# Patient Record
Sex: FEMALE | Race: AMERICAN INDIAN OR ALASKA NATIVE | NOT HISPANIC OR LATINO
[De-identification: names, ages, dates, MRNs, and addresses within clinical notes are randomized per-mention and may not be internally consistent; named-entity substitution may affect disease eponyms.]

---

## 2020-01-15 ENCOUNTER — RESULT REVIEW (OUTPATIENT)
Age: 55
End: 2020-01-15

## 2021-08-05 PROBLEM — Z00.00 ENCOUNTER FOR PREVENTIVE HEALTH EXAMINATION: Status: ACTIVE | Noted: 2021-08-05

## 2021-08-13 ENCOUNTER — APPOINTMENT (OUTPATIENT)
Dept: ORTHOPEDIC SURGERY | Facility: CLINIC | Age: 56
End: 2021-08-13
Payer: MEDICAID

## 2021-08-13 ENCOUNTER — NON-APPOINTMENT (OUTPATIENT)
Age: 56
End: 2021-08-13

## 2021-08-13 VITALS
WEIGHT: 164 LBS | HEART RATE: 59 BPM | HEIGHT: 60 IN | SYSTOLIC BLOOD PRESSURE: 142 MMHG | DIASTOLIC BLOOD PRESSURE: 75 MMHG | BODY MASS INDEX: 32.2 KG/M2

## 2021-08-13 DIAGNOSIS — Z86.2 PERSONAL HISTORY OF DISEASES OF THE BLOOD AND BLOOD-FORMING ORGANS AND CERTAIN DISORDERS INVOLVING THE IMMUNE MECHANISM: ICD-10-CM

## 2021-08-13 DIAGNOSIS — Z87.39 PERSONAL HISTORY OF OTHER DISEASES OF THE MUSCULOSKELETAL SYSTEM AND CONNECTIVE TISSUE: ICD-10-CM

## 2021-08-13 DIAGNOSIS — D89.89 OTHER SPECIFIED DISORDERS INVOLVING THE IMMUNE MECHANISM, NOT ELSEWHERE CLASSIFIED: ICD-10-CM

## 2021-08-13 DIAGNOSIS — Z82.49 FAMILY HISTORY OF ISCHEMIC HEART DISEASE AND OTHER DISEASES OF THE CIRCULATORY SYSTEM: ICD-10-CM

## 2021-08-13 DIAGNOSIS — Z82.3 FAMILY HISTORY OF STROKE: ICD-10-CM

## 2021-08-13 DIAGNOSIS — Z86.79 PERSONAL HISTORY OF OTHER DISEASES OF THE CIRCULATORY SYSTEM: ICD-10-CM

## 2021-08-13 DIAGNOSIS — Z87.891 PERSONAL HISTORY OF NICOTINE DEPENDENCE: ICD-10-CM

## 2021-08-13 PROCEDURE — 73110 X-RAY EXAM OF WRIST: CPT | Mod: RT

## 2021-08-13 PROCEDURE — 99204 OFFICE O/P NEW MOD 45 MIN: CPT | Mod: 25

## 2021-08-13 PROCEDURE — 73130 X-RAY EXAM OF HAND: CPT | Mod: RT

## 2021-08-13 PROCEDURE — 20605 DRAIN/INJ JOINT/BURSA W/O US: CPT | Mod: RT

## 2021-08-13 RX ORDER — LOSARTAN POTASSIUM 100 MG/1
100 TABLET, FILM COATED ORAL
Refills: 0 | Status: ACTIVE | COMMUNITY

## 2021-08-13 RX ORDER — LIDOCAINE HYDROCHLORIDE 10 MG/ML
1 INJECTION, SOLUTION INFILTRATION; PERINEURAL
Refills: 0 | Status: COMPLETED | OUTPATIENT
Start: 2021-08-13

## 2021-08-13 RX ORDER — METHYLPRED ACET/NACL,ISO-OS/PF 40 MG/ML
40 VIAL (ML) INJECTION
Qty: 1 | Refills: 0 | Status: COMPLETED | OUTPATIENT
Start: 2021-08-13

## 2021-08-13 RX ADMIN — METHYLPREDNISOLONE ACETATE 1 MG/ML: 40 INJECTION, SUSPENSION INTRALESIONAL; INTRAMUSCULAR; INTRASYNOVIAL; SOFT TISSUE at 00:00

## 2021-08-13 RX ADMIN — LIDOCAINE HYDROCHLORIDE 0.5 %: 10 INJECTION, SOLUTION INFILTRATION; PERINEURAL at 00:00

## 2021-08-13 NOTE — DISCUSSION/SUMMARY
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  She is more symptomatic secondary to the STT joint arthritis.\par \par I had a discussion with the patient regarding today's visit, the prognosis of this diagnosis, and treatment recommendations and options. At this time, I recommended a cortisone injection into the right STT joint. She understands this may not provide long-term relief and if it does not then she may ultimately require surgical intervention.\par \par She has agreed to the above plan of management and has expressed full understanding.  All questions were fully answered to their satisfaction. \par \par My cumulative time spent on this visit included: Preparation for the visit, review of the medical records, review of pertinent diagnostic studies, examination and counseling of the patient on the above diagnosis, treatment plan and prognosis, orders of diagnostic tests, medication and/or appropriate procedures and documentation in the medical records of today's visit.

## 2021-08-13 NOTE — END OF VISIT
[FreeTextEntry3] : This note was written by Adela Zapien on 08/13/2021 acting solely as a scribe for Dr. Bipin Cano.\par  \par All medical record entries made by the Scribe were at my, Dr. Bipin Cano, direction and personally dictated by me on 08/13/2021. I have personally reviewed the chart and agree that the record accurately reflects my personal performance of the history, physical exam, assessment and plan.

## 2021-08-13 NOTE — ADDENDUM
[FreeTextEntry1] : I, Adela Zapien, acted solely as a scribe for Dr. Cano on this date on 08/13/2021.

## 2021-08-13 NOTE — PROCEDURE
[FreeTextEntry1] : - After a discussion of risks and benefits, the patient agreed to proceed with a cortisone injection.  \par -  Side Injected: Right wrist STT joint\par -  Medications injected: 0.5cc of 1% Lidocaine and 1cc of 40mg of Depomedrol, using sterile technique.\par -  Patient tolerated the procedure well, without complications.\par -  Patient noted immediate relief of the symptoms, secondary to the anesthetic effects of the injection.\par -  Patient was told that the pain may worsen for a day or two, and should then begin to improve.\par -  Instructions: The patient was instructed on the use of ice, anti-inflammatory agents, or Tylenol, and activity modification.\par -  Follow-up: Within 4 weeks to assess the response to the injection.

## 2021-08-13 NOTE — HISTORY OF PRESENT ILLNESS
[Right] : right hand dominant [FreeTextEntry1] : She comes in today for evaluation of right hand and wrist pain which began 1 year ago. She reports the pain to have progressed and worsened of late. She denies injury. She states she has an associated numbness and tingling in the mornings when she wakes. She has had a previous xray and MRI of the right wrist. She states she wears a thumb spica brace with some relief. She has yet to receive treatment for her symptoms. \par \par She works as a baker.\par \par She was referred by her PCP.

## 2021-08-13 NOTE — PHYSICAL EXAM
[de-identified] : - Constitutional: This is a female in no obvious distress.  \par - Psych: Patient is alert and oriented to person, place and time.  Patient has a normal mood and affect.\par - Cardiovascular: Normal pulses throughout the upper extremities.  No significant varicosities are noted in the upper extremities. \par - Neuro: Strength and sensation are intact throughout the upper extremities.  Patient has normal coordination.\par - Respiratory:  Patient exhibits no evidence of shortness of breath or difficulty breathing.\par - Skin: No rashes, lesions, or other abnormalities are noted in the upper extremities.\par \par --- \par \par Examination of her right hand and wrist demonstrate swelling dorsally at the STT joint.  She is tender in this region, and she states that this is where she is most symptomatic.  She also has milder tenderness along the CMC joint of the thumb.  There is minimal tenderness along the ulnocarpal joint in the region of the TFCC ligament.  There is mild tenderness along the dorsal wrist capsule in the region of the scapholunate ligament and mild tenderness without swelling along the FCR tendon.  She has full flexion and extension of the digits.  Provocative signs for carpal tunnel syndrome are negative.  She is neurologically intact distally along the radial, ulnar and median nerve distributions. 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  There is radial subluxation of the first metacarpal on the trapezium.  There is also widening of the scapholunate interval.\par \par I reviewed her MRI from 7/6/21. 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

## 2021-09-01 ENCOUNTER — APPOINTMENT (OUTPATIENT)
Dept: ORTHOPEDIC SURGERY | Facility: CLINIC | Age: 56
End: 2021-09-01

## 2021-09-10 ENCOUNTER — APPOINTMENT (OUTPATIENT)
Dept: ORTHOPEDIC SURGERY | Facility: CLINIC | Age: 56
End: 2021-09-10
Payer: MEDICAID

## 2021-09-10 PROCEDURE — 99214 OFFICE O/P EST MOD 30 MIN: CPT | Mod: 25

## 2021-09-10 PROCEDURE — 20605 DRAIN/INJ JOINT/BURSA W/O US: CPT | Mod: RT

## 2021-09-10 RX ORDER — LIDOCAINE HYDROCHLORIDE 10 MG/ML
1 INJECTION, SOLUTION INFILTRATION; PERINEURAL
Refills: 0 | Status: COMPLETED | OUTPATIENT
Start: 2021-09-10

## 2021-09-10 RX ORDER — METHYLPRED ACET/NACL,ISO-OS/PF 40 MG/ML
40 VIAL (ML) INJECTION
Qty: 1 | Refills: 0 | Status: COMPLETED | OUTPATIENT
Start: 2021-09-10

## 2021-09-10 RX ADMIN — LIDOCAINE HYDROCHLORIDE 0.5 %: 10 INJECTION, SOLUTION INFILTRATION; PERINEURAL at 00:00

## 2021-09-10 RX ADMIN — METHYLPREDNISOLONE ACETATE 1 MG/ML: 40 INJECTION, SUSPENSION INTRALESIONAL; INTRAMUSCULAR; INTRASYNOVIAL; SOFT TISSUE at 00:00

## 2021-09-10 NOTE — ADDENDUM
[FreeTextEntry1] : I, Adela Zapien, acted solely as a scribe for Dr. Cano on this date on 09/10/2021.

## 2021-09-10 NOTE — HISTORY OF PRESENT ILLNESS
[FreeTextEntry1] : Follow-up regarding right wrist and hand pain secondary to advanced STT joint arthritis as well as CMC joint arthritis of the thumb.  See note from when she was seen in the office 4 weeks ago.  She was given a cortisone injection at the STT joint.\par \par She notes no improvement from the cortisone injection 4 weeks ago. She reports a sharp pain in the wrist and hand at night. She rates her pain as a 10 out of 10 with use at this time.\par \par She works as a paige.

## 2021-09-10 NOTE — DISCUSSION/SUMMARY
[FreeTextEntry1] : I had a discussion regarding today's visit, the diagnosis and treatment recommendations and options.  We also discussed changes since the last visit.  At this time, I recommended a cortisone injection into the right thumb CMC joint.\par \par The patient has agreed to the above plan of management and has expressed full understanding.  All questions were fully answered to the patient's satisfaction.\par \par My cumulative time spent on today's visit was greater than 30 minutes and included: Preparation for the visit, review of the medical records, review of pertinent diagnostic studies, examination and counseling of the patient on the above diagnosis, treatment plan and prognosis, orders of diagnostic tests, medications and/or appropriate procedures and documentation in the medical records of today's visit.

## 2021-09-10 NOTE — END OF VISIT
[FreeTextEntry3] : This note was written by Adela Zapien on 09/10/2021 acting solely as a scribe for Dr. Bipin Cano.\par  \par All medical record entries made by the Scribe were at my, Dr. Bipin Cano, direction and personally dictated by me on 09/10/2021. I have personally reviewed the chart and agree that the record accurately reflects my personal performance of the history, physical exam, assessment and plan.

## 2021-09-10 NOTE — PHYSICAL EXAM
[de-identified] : - Constitutional: This is a female in no obvious distress.  \par - Psych: Patient is alert and oriented to person, place and time.  Patient has a normal mood and affect.\par - Cardiovascular: Normal pulses throughout the upper extremities.  No significant varicosities are noted in the upper extremities. \par - Neuro: Strength and sensation are intact throughout the upper extremities.  Patient has normal coordination.\par - Respiratory:  Patient exhibits no evidence of shortness of breath or difficulty breathing.\par - Skin: No rashes, lesions, or other abnormalities are noted in the upper extremities.\par \par --- \par \par Examination of her right hand and wrist no obvious residual swelling or tenderness dorsally at the STT joint.  She is more tender today along the CMC joint of the thumb.  She does have underlying ligamentous laxity of the CMC joint.  There is mild tenderness along the dorsal wrist capsule in the region of the scapholunate ligament and mild tenderness without swelling along the FCR tendon.  She has full flexion and extension of the digits.  Provocative signs for carpal tunnel syndrome are negative.  She remains neurologically intact distally along the radial, ulnar and median nerve distributions. 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  There is radial subluxation of the first metacarpal on the trapezium.  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

## 2021-10-08 ENCOUNTER — APPOINTMENT (OUTPATIENT)
Dept: ORTHOPEDIC SURGERY | Facility: CLINIC | Age: 56
End: 2021-10-08

## 2021-10-12 ENCOUNTER — NON-APPOINTMENT (OUTPATIENT)
Age: 56
End: 2021-10-12

## 2021-10-20 ENCOUNTER — APPOINTMENT (OUTPATIENT)
Dept: ORTHOPEDIC SURGERY | Facility: CLINIC | Age: 56
End: 2021-10-20
Payer: MEDICAID

## 2021-10-20 PROCEDURE — 99213 OFFICE O/P EST LOW 20 MIN: CPT

## 2021-10-20 NOTE — ADDENDUM
[FreeTextEntry1] : I, Adela Zapien, acted solely as a scribe for Dr. Cano on this date on 10/20/2021.

## 2021-10-20 NOTE — END OF VISIT
[FreeTextEntry3] : This note was written by Adela Zapien on 10/20/2021 acting solely as a scribe for Dr. Bipin Cano.\par  \par All medical record entries made by the Scribe were at my, Dr. Bipin Cano, direction and personally dictated by me on 10/20/2021. I have personally reviewed the chart and agree that the record accurately reflects my personal performance of the history, physical exam, assessment and plan.

## 2021-10-20 NOTE — PHYSICAL EXAM
[de-identified] : - Constitutional: This is a female in no obvious distress.  \par - Psych: Patient is alert and oriented to person, place and time.  Patient has a normal mood and affect.\par - Cardiovascular: Normal pulses throughout the upper extremities.  No significant varicosities are noted in the upper extremities. \par - Neuro: Strength and sensation are intact throughout the upper extremities.  Patient has normal coordination.\par - Respiratory:  Patient exhibits no evidence of shortness of breath or difficulty breathing.\par - Skin: No rashes, lesions, or other abnormalities are noted in the upper extremities.\par \par --- \par \par Examination of her right hand and wrist no obvious residual swelling or tenderness dorsally at the STT joint.  She has mild residual tenderness along the CMC joint which is much improved.  She does have underlying ligamentous laxity of the CMC joint.  She has full flexion and extension of the digits.  Provocative signs for carpal tunnel syndrome are negative.  She remains neurologically intact distally along the radial, ulnar and median nerve distributions. 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  There is radial subluxation of the first metacarpal on the trapezium.  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

## 2021-10-20 NOTE — HISTORY OF PRESENT ILLNESS
[FreeTextEntry1] : 40 days status post right thumb CMC joint cortisone injection #1 and greater than 2 months status post right wrist STT joint cortisone injection #1.\par \par She returns today, stating the injection did provide some relief. She notes post injection she was unable to move the hand for 3 days due to soreness but that has since resolved and she is feeling better. \par \par She works as a baker.

## 2021-10-20 NOTE — DISCUSSION/SUMMARY
[FreeTextEntry1] : I had a discussion regarding today's visit, the diagnosis and treatment recommendations and options.  We also discussed changes since the last visit.  At this time, as she is doing well, I have recommended observation to see if her symptoms continue to improve on their own. Additionally, as per her request, I am referring her to hand therapy, she was provided with the appropriate referral.  She will also be fitted with a CMC joint opponent splint.\par \par The patient has agreed to the above plan of management and has expressed full understanding.  All questions were fully answered to the patient's satisfaction.\par \par My cumulative time spent on today's visit was greater than 30 minutes and included: Preparation for the visit, review of the medical records, review of pertinent diagnostic studies, examination and counseling of the patient on the above diagnosis, treatment plan and prognosis, orders of diagnostic tests, medications and/or appropriate procedures and documentation in the medical records of today's visit.

## 2022-02-16 ENCOUNTER — TRANSCRIPTION ENCOUNTER (OUTPATIENT)
Age: 57
End: 2022-02-16

## 2022-05-20 ENCOUNTER — EMERGENCY (EMERGENCY)
Facility: HOSPITAL | Age: 57
LOS: 1 days | Discharge: HOME | End: 2022-05-20
Attending: EMERGENCY MEDICINE
Payer: MEDICAID

## 2022-05-20 VITALS
RESPIRATION RATE: 18 BRPM | SYSTOLIC BLOOD PRESSURE: 173 MMHG | WEIGHT: 175.93 LBS | HEART RATE: 86 BPM | DIASTOLIC BLOOD PRESSURE: 113 MMHG | OXYGEN SATURATION: 95 % | TEMPERATURE: 98 F

## 2022-05-20 LAB
ALBUMIN SERPL ELPH-MCNC: 3.3 G/DL — LOW (ref 3.5–5)
ALP SERPL-CCNC: 61 U/L — SIGNIFICANT CHANGE UP (ref 40–120)
ALT FLD-CCNC: 32 U/L DA — SIGNIFICANT CHANGE UP (ref 10–60)
ANION GAP SERPL CALC-SCNC: 7 MMOL/L — SIGNIFICANT CHANGE UP (ref 5–17)
AST SERPL-CCNC: 19 U/L — SIGNIFICANT CHANGE UP (ref 10–40)
BASOPHILS # BLD AUTO: 0.04 K/UL — SIGNIFICANT CHANGE UP (ref 0–0.2)
BASOPHILS NFR BLD AUTO: 0.4 % — SIGNIFICANT CHANGE UP (ref 0–2)
BILIRUB SERPL-MCNC: 0.2 MG/DL — SIGNIFICANT CHANGE UP (ref 0.2–1.2)
BUN SERPL-MCNC: 20 MG/DL — HIGH (ref 7–18)
CALCIUM SERPL-MCNC: 8.7 MG/DL — SIGNIFICANT CHANGE UP (ref 8.4–10.5)
CHLORIDE SERPL-SCNC: 107 MMOL/L — SIGNIFICANT CHANGE UP (ref 96–108)
CO2 SERPL-SCNC: 27 MMOL/L — SIGNIFICANT CHANGE UP (ref 22–31)
CREAT SERPL-MCNC: 0.79 MG/DL — SIGNIFICANT CHANGE UP (ref 0.5–1.3)
EGFR: 87 ML/MIN/1.73M2 — SIGNIFICANT CHANGE UP
EOSINOPHIL # BLD AUTO: 0.21 K/UL — SIGNIFICANT CHANGE UP (ref 0–0.5)
EOSINOPHIL NFR BLD AUTO: 2.1 % — SIGNIFICANT CHANGE UP (ref 0–6)
GLUCOSE SERPL-MCNC: 109 MG/DL — HIGH (ref 70–99)
HCT VFR BLD CALC: 34 % — LOW (ref 34.5–45)
HGB BLD-MCNC: 10.8 G/DL — LOW (ref 11.5–15.5)
IMM GRANULOCYTES NFR BLD AUTO: 0.2 % — SIGNIFICANT CHANGE UP (ref 0–1.5)
LIDOCAIN IGE QN: 321 U/L — SIGNIFICANT CHANGE UP (ref 73–393)
LYMPHOCYTES # BLD AUTO: 3.32 K/UL — HIGH (ref 1–3.3)
LYMPHOCYTES # BLD AUTO: 33.4 % — SIGNIFICANT CHANGE UP (ref 13–44)
MAGNESIUM SERPL-MCNC: 2 MG/DL — SIGNIFICANT CHANGE UP (ref 1.6–2.6)
MCHC RBC-ENTMCNC: 25.5 PG — LOW (ref 27–34)
MCHC RBC-ENTMCNC: 31.8 GM/DL — LOW (ref 32–36)
MCV RBC AUTO: 80.2 FL — SIGNIFICANT CHANGE UP (ref 80–100)
MONOCYTES # BLD AUTO: 0.8 K/UL — SIGNIFICANT CHANGE UP (ref 0–0.9)
MONOCYTES NFR BLD AUTO: 8 % — SIGNIFICANT CHANGE UP (ref 2–14)
NEUTROPHILS # BLD AUTO: 5.56 K/UL — SIGNIFICANT CHANGE UP (ref 1.8–7.4)
NEUTROPHILS NFR BLD AUTO: 55.9 % — SIGNIFICANT CHANGE UP (ref 43–77)
NRBC # BLD: 0 /100 WBCS — SIGNIFICANT CHANGE UP (ref 0–0)
NT-PROBNP SERPL-SCNC: 45 PG/ML — SIGNIFICANT CHANGE UP (ref 0–125)
PLATELET # BLD AUTO: 374 K/UL — SIGNIFICANT CHANGE UP (ref 150–400)
POTASSIUM SERPL-MCNC: 4 MMOL/L — SIGNIFICANT CHANGE UP (ref 3.5–5.3)
POTASSIUM SERPL-SCNC: 4 MMOL/L — SIGNIFICANT CHANGE UP (ref 3.5–5.3)
PROT SERPL-MCNC: 7.7 G/DL — SIGNIFICANT CHANGE UP (ref 6–8.3)
RBC # BLD: 4.24 M/UL — SIGNIFICANT CHANGE UP (ref 3.8–5.2)
RBC # FLD: 15.4 % — HIGH (ref 10.3–14.5)
SARS-COV-2 RNA SPEC QL NAA+PROBE: SIGNIFICANT CHANGE UP
SODIUM SERPL-SCNC: 141 MMOL/L — SIGNIFICANT CHANGE UP (ref 135–145)
TROPONIN I, HIGH SENSITIVITY RESULT: 5.9 NG/L — SIGNIFICANT CHANGE UP
TSH SERPL-MCNC: 2.28 UU/ML — SIGNIFICANT CHANGE UP (ref 0.34–4.82)
WBC # BLD: 9.95 K/UL — SIGNIFICANT CHANGE UP (ref 3.8–10.5)
WBC # FLD AUTO: 9.95 K/UL — SIGNIFICANT CHANGE UP (ref 3.8–10.5)

## 2022-05-20 PROCEDURE — 99285 EMERGENCY DEPT VISIT HI MDM: CPT

## 2022-05-20 PROCEDURE — 71046 X-RAY EXAM CHEST 2 VIEWS: CPT | Mod: 26

## 2022-05-20 RX ORDER — IBUPROFEN 200 MG
600 TABLET ORAL ONCE
Refills: 0 | Status: COMPLETED | OUTPATIENT
Start: 2022-05-20 | End: 2022-05-20

## 2022-05-20 NOTE — ED PROVIDER NOTE - PATIENT PORTAL LINK FT
You can access the FollowMyHealth Patient Portal offered by [facility] by registering at the following website: [email or web address]. By joining [facility]’s FollowMyHealth portal, you will also be able to view your health information using other applications (apps) compatible with our system.

## 2022-05-20 NOTE — ED PROVIDER NOTE - NSFOLLOWUPINSTRUCTIONS_ED_ALL_ED_FT
HAPPY BIRTHDAY!    WHAT YOU NEED TO KNOW:    Leg edema is swelling caused by fluid buildup. Your legs may swell if you sit or stand for long periods of time, are pregnant, or are injured. Swelling may also occur if you have heart failure or circulation problems. This means that your heart does not pump blood through your body as it should.  Lower Leg Edema         DISCHARGE INSTRUCTIONS:    Call your local emergency number (911 in the [place]) for any of the following:   •You cannot walk.      •You have chest pain or trouble breathing that is worse when you lie down.      •You suddenly feel lightheaded and have trouble breathing.      •You have new and sudden chest pain. You may have more pain when you take deep breaths or cough.      •You cough up blood.      Return to the emergency department if:   •You feel faint or confused.      •Your skin turns blue or gray.      •Your leg feels warm, tender, and painful. It may be swollen and red.      Call your doctor if:   •You have a fever or feel more tired than usual.      •The veins in your legs look larger than usual. They may look full or bulging.      •Your legs itch or feel heavy.      •You have red or white areas or sores on your legs. The skin may also appear dimpled or have indentations.      •You are gaining weight.      •You have trouble moving your ankles.      •The swelling does not go away, or other parts of your body swell.      •You have questions or concerns about your condition or care.      Self-care:   •Elevate your legs. Raise your legs above the level of your heart as often as you can. This will help decrease swelling and pain. Prop your legs on pillows or blankets to keep them elevated comfortably.  Elevate Leg           •Wear pressure stockings, if directed. These tight stockings put pressure on your legs to promote blood flow and prevent blood clots. Put them on before you get out of bed. Wear the stockings during the day. Do not wear them while you sleep.  Pressure Stockings            •Stay active. Do not stand or sit for long periods of time. Ask your healthcare provider about the best exercise plan for you.  Walking for Exercise           •Eat healthy foods. Healthy foods include fruits, vegetables, whole-grain breads, low-fat dairy products, beans, lean meats, and fish. Ask if you need to be on a special diet.  Healthy Foods           •Limit sodium (salt). Salt will make your body hold even more fluid. Your healthcare provider will tell you how many milligrams (mg) of salt you can have each day.             Follow up with your doctor as directed: Write down your questions so you remember to ask them during your visits.
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
## 2022-05-20 NOTE — ED PROVIDER NOTE - CLINICAL SUMMARY MEDICAL DECISION MAKING FREE TEXT BOX
56 year old female with leg swelling recently increased her blood pressure meds including Amlodipine. Symptoms maybe the side effects of the medications. Will get labs to reassess for kidney/thyroid/heart disease.

## 2022-05-20 NOTE — ED ADULT NURSE NOTE - NSIMPLEMENTINTERV_GEN_ALL_ED
Implemented All Universal Safety Interventions:  [place] to call system. Call bell, personal items and telephone within reach. Instruct patient to call for assistance. Room bathroom lighting operational. Non-slip footwear when patient is off stretcher. Physically safe environment: no spills, clutter or unnecessary equipment. Stretcher in lowest position, wheels locked, appropriate side rails in place.

## 2022-05-20 NOTE — ED PROVIDER NOTE - OBJECTIVE STATEMENT
56 year old female with PMHx of HTN (Low Zartan and Amlodipine) is presenting to the ED with chief complaint of leg swelling with discomfort since yesterday. Patient works as a PCA for night shifts. Right before work she felt light headed so she checked her blood pressure, which was elevated. According to the patient, she had swelling since last week but resolved on its own. For the past 2 months her HTN meds were both increased to 10mg. No chest pain, shortness of breath, nausea, dizziness, and history of heart or kidney disease. NKDA.

## 2022-05-21 VITALS
OXYGEN SATURATION: 100 % | DIASTOLIC BLOOD PRESSURE: 89 MMHG | TEMPERATURE: 98 F | RESPIRATION RATE: 18 BRPM | SYSTOLIC BLOOD PRESSURE: 158 MMHG | HEART RATE: 75 BPM

## 2022-05-21 PROCEDURE — 83880 ASSAY OF NATRIURETIC PEPTIDE: CPT

## 2022-05-21 PROCEDURE — 93005 ELECTROCARDIOGRAM TRACING: CPT

## 2022-05-21 PROCEDURE — 80053 COMPREHEN METABOLIC PANEL: CPT

## 2022-05-21 PROCEDURE — 84484 ASSAY OF TROPONIN QUANT: CPT

## 2022-05-21 PROCEDURE — 71046 X-RAY EXAM CHEST 2 VIEWS: CPT

## 2022-05-21 PROCEDURE — 99285 EMERGENCY DEPT VISIT HI MDM: CPT | Mod: 25

## 2022-05-21 PROCEDURE — 87635 SARS-COV-2 COVID-19 AMP PRB: CPT

## 2022-05-21 PROCEDURE — 36415 COLL VENOUS BLD VENIPUNCTURE: CPT

## 2022-05-21 PROCEDURE — 83690 ASSAY OF LIPASE: CPT

## 2022-05-21 PROCEDURE — 83735 ASSAY OF MAGNESIUM: CPT

## 2022-05-21 PROCEDURE — 85025 COMPLETE CBC W/AUTO DIFF WBC: CPT

## 2022-05-21 PROCEDURE — 84443 ASSAY THYROID STIM HORMONE: CPT

## 2022-05-21 RX ADMIN — Medication 600 MILLIGRAM(S): at 00:15

## 2022-05-26 PROBLEM — I10 ESSENTIAL (PRIMARY) HYPERTENSION: Chronic | Status: ACTIVE | Noted: 2022-05-21

## 2022-06-01 ENCOUNTER — APPOINTMENT (OUTPATIENT)
Dept: ORTHOPEDIC SURGERY | Facility: CLINIC | Age: 57
End: 2022-06-01
Payer: COMMERCIAL

## 2022-06-01 ENCOUNTER — APPOINTMENT (OUTPATIENT)
Dept: ORTHOPEDIC SURGERY | Facility: CLINIC | Age: 57
End: 2022-06-01

## 2022-06-01 ENCOUNTER — NON-APPOINTMENT (OUTPATIENT)
Age: 57
End: 2022-06-01

## 2022-06-01 VITALS — SYSTOLIC BLOOD PRESSURE: 139 MMHG | DIASTOLIC BLOOD PRESSURE: 87 MMHG | HEART RATE: 67 BPM

## 2022-06-01 DIAGNOSIS — M25.562 PAIN IN RIGHT KNEE: ICD-10-CM

## 2022-06-01 DIAGNOSIS — M25.561 PAIN IN RIGHT KNEE: ICD-10-CM

## 2022-06-01 PROCEDURE — 20610 DRAIN/INJ JOINT/BURSA W/O US: CPT | Mod: LT

## 2022-06-01 PROCEDURE — 73564 X-RAY EXAM KNEE 4 OR MORE: CPT | Mod: 50

## 2022-06-01 PROCEDURE — 99215 OFFICE O/P EST HI 40 MIN: CPT | Mod: 25

## 2022-06-01 RX ORDER — LIDOCAINE HYDROCHLORIDE 10 MG/ML
1 INJECTION, SOLUTION INFILTRATION; PERINEURAL
Refills: 0 | Status: COMPLETED | OUTPATIENT
Start: 2022-06-01

## 2022-06-01 RX ORDER — METHYLPRED ACET/NACL,ISO-OS/PF 80 MG/ML
80 VIAL (ML) INJECTION
Qty: 1 | Refills: 0 | Status: COMPLETED | OUTPATIENT
Start: 2022-06-01

## 2022-06-01 RX ADMIN — LIDOCAINE HYDROCHLORIDE 3 %: 10 INJECTION, SOLUTION INFILTRATION; PERINEURAL at 00:00

## 2022-06-01 RX ADMIN — METHYLPREDNISOLONE ACETATE 1 MG/ML: 80 INJECTION, SUSPENSION INTRA-ARTICULAR; INTRALESIONAL; INTRAMUSCULAR; SOFT TISSUE at 00:00

## 2022-06-07 ENCOUNTER — NON-APPOINTMENT (OUTPATIENT)
Age: 57
End: 2022-06-07

## 2022-06-08 ENCOUNTER — EMERGENCY (EMERGENCY)
Facility: HOSPITAL | Age: 57
LOS: 1 days | Discharge: HOME | End: 2022-06-08
Attending: EMERGENCY MEDICINE
Payer: COMMERCIAL

## 2022-06-08 VITALS
WEIGHT: 171.96 LBS | SYSTOLIC BLOOD PRESSURE: 164 MMHG | RESPIRATION RATE: 16 BRPM | OXYGEN SATURATION: 99 % | HEART RATE: 85 BPM | DIASTOLIC BLOOD PRESSURE: 90 MMHG | TEMPERATURE: 98 F

## 2022-06-08 VITALS
DIASTOLIC BLOOD PRESSURE: 83 MMHG | RESPIRATION RATE: 17 BRPM | SYSTOLIC BLOOD PRESSURE: 153 MMHG | TEMPERATURE: 98 F | HEART RATE: 76 BPM | OXYGEN SATURATION: 98 %

## 2022-06-08 PROCEDURE — 99284 EMERGENCY DEPT VISIT MOD MDM: CPT

## 2022-06-08 PROCEDURE — 99282 EMERGENCY DEPT VISIT SF MDM: CPT

## 2022-06-08 PROCEDURE — 99053 MED SERV 10PM-8AM 24 HR FAC: CPT

## 2022-06-08 RX ORDER — IBUPROFEN 200 MG
600 TABLET ORAL ONCE
Refills: 0 | Status: DISCONTINUED | OUTPATIENT
Start: 2022-06-08 | End: 2022-06-11

## 2022-06-08 NOTE — ED PROVIDER NOTE - PATIENT PORTAL LINK FT
You can access the FollowMyHealth Patient Portal offered by [facility] by registering at the following website: [email or web address]. By joining [facility]’s FollowMyHealth portal, you will also be able to view your health information using other applications (apps) compatible with our system.

## 2022-06-08 NOTE — ED PROVIDER NOTE - MUSCULOSKELETAL, MLM
Spine appears normal, range of motion is not limited, no joint tenderness. ttp at let upper shoulder, no hematoma.
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
## 2022-06-08 NOTE — ED ADULT NURSE NOTE - CHPI ED NUR SYMPTOMS NEG
no abrasion/no blurred vision/no change in level of consciousness/no chest pain/no chest wall tenderness/no loss of consciousness/no seizure/no vomiting/no weakness

## 2022-06-08 NOTE — ED PROVIDER NOTE - CONSTITUTIONAL, MLM
Well appearing, awake, alert, oriented to person, place, time/situation and in no apparent distress. crying normal...
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
## 2022-06-14 ENCOUNTER — APPOINTMENT (OUTPATIENT)
Dept: ORTHOPEDIC SURGERY | Facility: CLINIC | Age: 57
End: 2022-06-14

## 2022-06-15 ENCOUNTER — APPOINTMENT (OUTPATIENT)
Dept: ORTHOPEDIC SURGERY | Facility: CLINIC | Age: 57
End: 2022-06-15
Payer: COMMERCIAL

## 2022-06-15 VITALS — HEART RATE: 76 BPM | SYSTOLIC BLOOD PRESSURE: 149 MMHG | DIASTOLIC BLOOD PRESSURE: 82 MMHG

## 2022-06-15 PROCEDURE — 20610 DRAIN/INJ JOINT/BURSA W/O US: CPT | Mod: RT

## 2022-06-15 PROCEDURE — 99214 OFFICE O/P EST MOD 30 MIN: CPT | Mod: 25

## 2022-06-15 RX ORDER — HYALURONATE SODIUM 30 MG/2 ML
30 SYRINGE (ML) INTRAARTICULAR
Qty: 8 | Refills: 0 | Status: ACTIVE | OUTPATIENT
Start: 2022-06-01

## 2022-06-15 NOTE — PROCEDURE
[de-identified] : Using sterile technique, 2cc of depomedrol 40mg/ml and 3cc of 1% plain lidocaine was drawn up into a sterile 5cc syringe.  The right knee was then sterilely prepped with chlorhexidine and ethylene chloride spray was used as an anesthetic prior to injection.  The depomedrol/lidocaine mixture was injected into the knee joint just above and lateral to the patella into the suprapatellar pouch.  The patient tolerated the procedure well without difficulty.  The patient was given instructions on the use of ice and anti-inflammatories post injection site soreness
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
## 2022-06-15 NOTE — DISCUSSION/SUMMARY
[de-identified] : This patient presents today for follow-up regarding osteoarthritis about both knees.  On the last office visit she did have an injection into the left knee which gave her significant improvement.  She continues to complain of pain about the right knee.  On today's visit we injected the right knee with Depo-Medrol lidocaine.  I do think she is a good candidate for viscosupplementation I will request authorization for this.  Apparently it was requested previously however the request did not go through as she did not have an injection of cortisone into both knees.  We will request for the viscosupplementation.  I will see her back when the medicine is received in the office to begin treatment.  Patient will continue taking the meloxicam that was prescribed by our office.

## 2022-06-15 NOTE — PHYSICAL EXAM
[de-identified] : The patient appears well nourished  and in no apparent distress.  The patient is alert and oriented to person, place, and time.   Affect and mood appear normal.    The head is normocephalic and atraumatic.  The eyes reveal normal sclera and extra ocular muscles are intact.   The neck appears normal with no jugular venous distention or masses noted.   Skin shows normal turgor with no evidence of eczema or psoriasis.  No respiratory distress noted.  The patient ambulates with an antalgic gait.\par \par The right and left knees have decreased range of motion the left 5-115 in the right 0-120.  Tenderness is noted diffusely over the left knee.  Pain is noted with terminal flexion.  Crepitations are noted..  There is tenderness about the medial joint bilaterally.  Negative Ai sign.  There is no soft tissue swelling, warmth, or erythema.   There is a negative Lachman sign.  There is no instability to varus/valgus stress or anterior/posterior drawer.  There is normal strength in the in the quadriceps and hamstring muscles.  Sensation is intact to the lower estremity. There are normal pulses distally and good capillary refill. No edema or lymphadenopathy noted.  \par

## 2022-06-15 NOTE — REASON FOR VISIT
[Follow-Up Visit] : a follow-up visit for [FreeTextEntry2] : Primary osteoarthritis of bilateral knees

## 2022-06-24 ENCOUNTER — NON-APPOINTMENT (OUTPATIENT)
Age: 57
End: 2022-06-24

## 2022-07-08 RX ORDER — DICLOFENAC SODIUM 75 MG/1
75 TABLET, DELAYED RELEASE ORAL
Qty: 30 | Refills: 0 | Status: ACTIVE | COMMUNITY
Start: 2022-07-08 | End: 1900-01-01

## 2022-07-19 ENCOUNTER — APPOINTMENT (OUTPATIENT)
Dept: ORTHOPEDIC SURGERY | Facility: CLINIC | Age: 57
End: 2022-07-19

## 2022-08-03 ENCOUNTER — NON-APPOINTMENT (OUTPATIENT)
Age: 57
End: 2022-08-03

## 2022-08-04 RX ORDER — METHYLPRED ACET/NACL,ISO-OS/PF 80 MG/ML
80 VIAL (ML) INJECTION
Qty: 1 | Refills: 0 | Status: COMPLETED | OUTPATIENT
Start: 2022-08-04

## 2022-08-04 RX ORDER — LIDOCAINE HYDROCHLORIDE 5 MG/ML
0.5 INJECTION, SOLUTION INFILTRATION; PERINEURAL
Refills: 0 | Status: COMPLETED | OUTPATIENT
Start: 2022-08-04

## 2022-08-04 RX ADMIN — LIDOCAINE HYDROCHLORIDE %: 5 INJECTION, SOLUTION INFILTRATION; PERINEURAL at 00:00

## 2022-08-04 RX ADMIN — METHYLPREDNISOLONE ACETATE MG/ML: 80 INJECTION, SUSPENSION INTRA-ARTICULAR; INTRALESIONAL; INTRAMUSCULAR; SOFT TISSUE at 00:00

## 2022-08-09 ENCOUNTER — APPOINTMENT (OUTPATIENT)
Dept: ORTHOPEDIC SURGERY | Facility: CLINIC | Age: 57
End: 2022-08-09

## 2022-09-07 RX ORDER — HYALURONATE SODIUM 30 MG/2 ML
30 SYRINGE (ML) INTRAARTICULAR
Qty: 8 | Refills: 0 | Status: ACTIVE | OUTPATIENT
Start: 2022-06-01

## 2022-09-08 ENCOUNTER — APPOINTMENT (OUTPATIENT)
Dept: ORTHOPEDIC SURGERY | Facility: CLINIC | Age: 57
End: 2022-09-08

## 2022-09-08 ENCOUNTER — NON-APPOINTMENT (OUTPATIENT)
Age: 57
End: 2022-09-08

## 2022-09-08 VITALS — HEART RATE: 71 BPM | DIASTOLIC BLOOD PRESSURE: 79 MMHG | SYSTOLIC BLOOD PRESSURE: 145 MMHG

## 2022-09-08 PROCEDURE — 20611 DRAIN/INJ JOINT/BURSA W/US: CPT | Mod: 50

## 2022-09-08 RX ORDER — HYALURONATE SODIUM 30 MG/2 ML
30 SYRINGE (ML) INTRAARTICULAR
Refills: 0 | Status: COMPLETED | OUTPATIENT
Start: 2022-09-08

## 2022-09-08 RX ADMIN — Medication 2 MG/2ML: at 00:00

## 2022-09-08 NOTE — HISTORY OF PRESENT ILLNESS
[de-identified] : This patient presents today for the first Orthovisc injection to the knees.  Patient continues have significant pain which is 10 of 10 with activity.  She notes swelling.  She notes buckling about the knees as well.  She ambulates with a cane.  Pain is improved with rest.

## 2022-09-08 NOTE — PHYSICAL EXAM
[de-identified] : The patient appears well nourished  and in no apparent distress.  The patient is alert and oriented to person, place, and time.   Affect and mood appear normal.    The head is normocephalic and atraumatic.  The eyes reveal normal sclera and extra ocular muscles are intact.   The neck appears normal with no jugular venous distention or masses noted.   Skin shows normal turgor with no evidence of eczema or psoriasis.  No respiratory distress noted.  The patient ambulates with an antalgic gait.\par \par The right and left knees have decreased range of motion the left 5-115 in the right 0-120.  Tenderness is noted diffusely over the left knee.  Pain is noted with terminal flexion.  Crepitations are noted..  There is tenderness about the medial joint bilaterally.  Negative Ai sign.  There is no soft tissue swelling, warmth, or erythema.   There is a negative Lachman sign.  There is no instability to varus/valgus stress or anterior/posterior drawer.  There is normal strength in the in the quadriceps and hamstring muscles.  Sensation is intact to the lower estremity. There are normal pulses distally and good capillary refill. No edema or lymphadenopathy noted.  \par

## 2022-09-08 NOTE — REASON FOR VISIT
dr lee [Follow-Up Visit] : a follow-up visit for [FreeTextEntry2] : primary osteoarthritis of bilateral knees; 10/10 bilateral knees.  Here for Orthovisc #1.

## 2022-09-08 NOTE — DISCUSSION/SUMMARY
[de-identified] : The injection was tolerated without difficulty.  Instructions were given postinjection regarding ice, analgesics, and modification of activities .  I will see the patient back in one week for the next injection.

## 2022-09-08 NOTE — PROCEDURE
[de-identified] : A 22gauge needle was sterilely placed onto the syringe of Orthovisc the right and left knees were then sterilely prepped with chlorhexidine and ethylene chloride spray was used as an anesthetic just prior to the injection.  Under ultrasound guidance utilizing the Melchor Lumify ultrasound probe the Orthovisc was injected into each knee joint just above and lateral to the patella into the suprapatellar pouch.  Placement of the injection into the suprapatellar pouch was confirmed by ultrasound and a spot image was saved.  The patient tolerated the procedure well without difficulty.  The patient was given instructions on the use of ice and anti-inflammatories for post injection site soreness.

## 2022-09-14 ENCOUNTER — APPOINTMENT (OUTPATIENT)
Dept: ORTHOPEDIC SURGERY | Facility: CLINIC | Age: 57
End: 2022-09-14

## 2022-09-14 VITALS — HEART RATE: 70 BPM | SYSTOLIC BLOOD PRESSURE: 166 MMHG | DIASTOLIC BLOOD PRESSURE: 98 MMHG

## 2022-09-14 PROCEDURE — 20611 DRAIN/INJ JOINT/BURSA W/US: CPT | Mod: 50

## 2022-09-14 RX ORDER — HYALURONATE SODIUM 30 MG/2 ML
30 SYRINGE (ML) INTRAARTICULAR
Refills: 0 | Status: COMPLETED | OUTPATIENT
Start: 2022-09-14

## 2022-09-14 RX ADMIN — Medication 2 MG/2ML: at 00:00

## 2022-09-14 NOTE — PHYSICAL EXAM
[de-identified] : The patient appears well nourished  and in no apparent distress.  The patient is alert and oriented to person, place, and time.   Affect and mood appear normal.    The head is normocephalic and atraumatic.  The eyes reveal normal sclera and extra ocular muscles are intact.   The neck appears normal with no jugular venous distention or masses noted.   Skin shows normal turgor with no evidence of eczema or psoriasis.  No respiratory distress noted.  The patient ambulates with an antalgic gait.\par \par The right and left knees have decreased range of motion the left 5-115 in the right 0-120.  Tenderness is noted diffusely over the left knee.  Pain is noted with terminal flexion.  Crepitations are noted..  There is tenderness about the medial joint bilaterally.  Negative Ai sign.  There is no soft tissue swelling, warmth, or erythema.   There is a negative Lachman sign.  There is no instability to varus/valgus stress or anterior/posterior drawer.  There is normal strength in the in the quadriceps and hamstring muscles.  Sensation is intact to the lower estremity. There are normal pulses distally and good capillary refill. No edema or lymphadenopathy noted.  \par

## 2022-09-14 NOTE — HISTORY OF PRESENT ILLNESS
[de-identified] : This patient presents today for evaluation regarding bilateral osteoarthritis of the knees.  She did have the first Orthovisc injection last week with about 60% improvement in the symptoms.  No adverse effects noted after the last injection.  Currently pain level is 6 out of 10 with activity.

## 2022-09-14 NOTE — DISCUSSION/SUMMARY
[de-identified] : The injection was tolerated without difficulty.  Instructions were given postinjection regarding ice, analgesics, and modification of activities .  I will see the patient back in one week for the next injection.

## 2022-09-14 NOTE — PROCEDURE
[de-identified] : A 22gauge needle was sterilely placed onto the syringe of Orthovisc the right and left knees were then sterilely prepped with chlorhexidine and ethylene chloride spray was used as an anesthetic just prior to the injection.  Under ultrasound guidance utilizing the Melchor Lumify ultrasound probe the Orthovisc was injected into each knee joint just above and lateral to the patella into the suprapatellar pouch.  Placement of the injection into the suprapatellar pouch was confirmed by ultrasound and a spot image was saved.  The patient tolerated the procedure well without difficulty.  The patient was given instructions on the use of ice and anti-inflammatories for post injection site soreness.

## 2022-09-14 NOTE — REASON FOR VISIT
[Follow-Up Visit] : a follow-up visit for [FreeTextEntry2] : Bilateral knees osteoarthritis , Orthovisc 2/4

## 2022-09-20 ENCOUNTER — APPOINTMENT (OUTPATIENT)
Dept: ORTHOPEDIC SURGERY | Facility: CLINIC | Age: 57
End: 2022-09-20

## 2022-09-20 VITALS — SYSTOLIC BLOOD PRESSURE: 148 MMHG | DIASTOLIC BLOOD PRESSURE: 75 MMHG | HEART RATE: 72 BPM

## 2022-09-20 PROCEDURE — 20611 DRAIN/INJ JOINT/BURSA W/US: CPT | Mod: 50

## 2022-09-20 RX ORDER — HYALURONATE SODIUM 30 MG/2 ML
30 SYRINGE (ML) INTRAARTICULAR
Refills: 0 | Status: COMPLETED | OUTPATIENT
Start: 2022-09-20

## 2022-09-20 RX ADMIN — Medication 2 MG/2ML: at 00:00

## 2022-09-20 NOTE — PHYSICAL EXAM
[de-identified] : The patient appears well nourished  and in no apparent distress.  The patient is alert and oriented to person, place, and time.   Affect and mood appear normal.    The head is normocephalic and atraumatic.  The eyes reveal normal sclera and extra ocular muscles are intact.   The neck appears normal with no jugular venous distention or masses noted.   Skin shows normal turgor with no evidence of eczema or psoriasis.  No respiratory distress noted.  The patient ambulates with an antalgic gait.\par \par The right and left knees have decreased range of motion the left 5-115 in the right 0-120.  Tenderness is noted diffusely over the left knee.  Pain is noted with terminal flexion.  Crepitations are noted..  There is tenderness about the medial joint bilaterally.  Negative Ai sign.  There is no soft tissue swelling, warmth, or erythema.   There is a negative Lachman sign.  There is no instability to varus/valgus stress or anterior/posterior drawer.  There is normal strength in the in the quadriceps and hamstring muscles.  Sensation is intact to the lower estremity. There are normal pulses distally and good capillary refill. No edema or lymphadenopathy noted.  \par

## 2022-09-20 NOTE — PROCEDURE
[de-identified] : A 22gauge needle was sterilely placed onto the syringe of Orthovisc the right and left knees were then sterilely prepped with chlorhexidine and ethylene chloride spray was used as an anesthetic just prior to the injection.  Under ultrasound guidance utilizing the Melchor Lumify ultrasound probe the Orthovisc was injected into each knee joint just above and lateral to the patella into the suprapatellar pouch.  Placement of the injection into the suprapatellar pouch was confirmed by ultrasound and a spot image was saved.  The patient tolerated the procedure well without difficulty.  The patient was given instructions on the use of ice and anti-inflammatories for post injection site soreness.

## 2022-09-20 NOTE — HISTORY OF PRESENT ILLNESS
[de-identified] : This patient presents for the third Orthovisc injection to both knees.  She has no problems after the last injection.  She is feeling much better with regards to the left knee which is 4 out of 10 pain the right knee 6 out of 10 is slightly sore due to continued symptoms.  No adverse effects noted after the injection.

## 2022-09-20 NOTE — REASON FOR VISIT
[Follow-Up Visit] : a follow-up visit for [FreeTextEntry2] : Primary osteoarthritis of bilateral knees, Orthovisc 3/4

## 2022-09-20 NOTE — DISCUSSION/SUMMARY
[de-identified] : The injection was tolerated without difficulty.  Instructions were given postinjection regarding ice, analgesics, and modification of activities .  I will see the patient back in one week for the next injection.

## 2022-09-27 ENCOUNTER — APPOINTMENT (OUTPATIENT)
Dept: ORTHOPEDIC SURGERY | Facility: CLINIC | Age: 57
End: 2022-09-27

## 2022-09-27 VITALS — SYSTOLIC BLOOD PRESSURE: 170 MMHG | DIASTOLIC BLOOD PRESSURE: 91 MMHG | HEART RATE: 56 BPM

## 2022-09-27 PROCEDURE — 20611 DRAIN/INJ JOINT/BURSA W/US: CPT | Mod: 50

## 2022-09-27 RX ORDER — HYALURONATE SODIUM 30 MG/2 ML
30 SYRINGE (ML) INTRAARTICULAR
Refills: 0 | Status: COMPLETED | OUTPATIENT
Start: 2022-09-27

## 2022-09-27 RX ADMIN — Medication 2 MG/2ML: at 00:00

## 2022-09-27 NOTE — REASON FOR VISIT
[Follow-Up Visit] : a follow-up visit for [FreeTextEntry2] : Primary osteoarthritis of bilateral knees, Orthovisc #4/4

## 2022-09-27 NOTE — DISCUSSION/SUMMARY
[de-identified] : Patient presents for the last Orthovisc injection to the knees.  The injection was performed under sterile conditions without difficulty.  Instructions are given postinjection for ice analgesics and modification of activities.  I like to see her back in 1 month to see how she is done with the injections.  Hopefully the right knee will start to improve after the last injection and catch up to the left in terms of overall pain relief.

## 2022-09-27 NOTE — HISTORY OF PRESENT ILLNESS
[de-identified] : This patient presents today for the fourth Orthovisc injection to the knees.  No adverse effects noted after the last injections.  She has noting significant improvement in the left and less improvement in the right knee.  The left knee 8 out of 10 with walking in the right knee 9 out of 10.  She feels some stiffness today.  She does ambulate with a cane.

## 2022-09-27 NOTE — PROCEDURE
[de-identified] : A 22gauge needle was sterilely placed onto the syringe of Orthovisc the right and left knees were then sterilely prepped with chlorhexidine and ethylene chloride spray was used as an anesthetic just prior to the injection.  Under ultrasound guidance utilizing the Melchor Lumify ultrasound probe the Orthovisc was injected into each knee joint just above and lateral to the patella into the suprapatellar pouch.  Placement of the injection into the suprapatellar pouch was confirmed by ultrasound and a spot image was saved.  The patient tolerated the procedure well without difficulty.  The patient was given instructions on the use of ice and anti-inflammatories for post injection site soreness.

## 2022-09-27 NOTE — PHYSICAL EXAM
[de-identified] : The patient appears well nourished  and in no apparent distress.  The patient is alert and oriented to person, place, and time.   Affect and mood appear normal.    The head is normocephalic and atraumatic.  The eyes reveal normal sclera and extra ocular muscles are intact.   The neck appears normal with no jugular venous distention or masses noted.   Skin shows normal turgor with no evidence of eczema or psoriasis.  No respiratory distress noted.  The patient ambulates with an antalgic gait.\par \par The right and left knees have decreased range of motion the left 5-115 in the right 0-120.  Tenderness is noted diffusely over the left knee.  Pain is noted with terminal flexion.  Crepitations are noted..  There is tenderness about the medial joint bilaterally.  Negative Ai sign.  There is no soft tissue swelling, warmth, or erythema.   There is a negative Lachman sign.  There is no instability to varus/valgus stress or anterior/posterior drawer.  There is normal strength in the in the quadriceps and hamstring muscles.  Sensation is intact to the lower estremity. There are normal pulses distally and good capillary refill. No edema or lymphadenopathy noted.  \par

## 2022-10-11 ENCOUNTER — APPOINTMENT (OUTPATIENT)
Dept: ORTHOPEDIC SURGERY | Facility: CLINIC | Age: 57
End: 2022-10-11

## 2022-10-12 ENCOUNTER — APPOINTMENT (OUTPATIENT)
Dept: ORTHOPEDIC SURGERY | Facility: CLINIC | Age: 57
End: 2022-10-12

## 2022-10-17 ENCOUNTER — APPOINTMENT (OUTPATIENT)
Dept: ORTHOPEDIC SURGERY | Facility: CLINIC | Age: 57
End: 2022-10-17

## 2022-11-01 ENCOUNTER — APPOINTMENT (OUTPATIENT)
Dept: ORTHOPEDIC SURGERY | Facility: CLINIC | Age: 57
End: 2022-11-01

## 2022-11-23 ENCOUNTER — APPOINTMENT (OUTPATIENT)
Dept: ORTHOPEDIC SURGERY | Facility: CLINIC | Age: 57
End: 2022-11-23

## 2022-11-29 ENCOUNTER — APPOINTMENT (OUTPATIENT)
Dept: ORTHOPEDIC SURGERY | Facility: CLINIC | Age: 57
End: 2022-11-29

## 2022-12-12 ENCOUNTER — APPOINTMENT (OUTPATIENT)
Dept: ORTHOPEDIC SURGERY | Facility: CLINIC | Age: 57
End: 2022-12-12

## 2022-12-19 ENCOUNTER — APPOINTMENT (OUTPATIENT)
Dept: ORTHOPEDIC SURGERY | Facility: CLINIC | Age: 57
End: 2022-12-19

## 2023-01-09 ENCOUNTER — APPOINTMENT (OUTPATIENT)
Dept: ORTHOPEDIC SURGERY | Facility: CLINIC | Age: 58
End: 2023-01-09

## 2023-01-11 ENCOUNTER — APPOINTMENT (OUTPATIENT)
Dept: ORTHOPEDIC SURGERY | Facility: CLINIC | Age: 58
End: 2023-01-11
Payer: MEDICAID

## 2023-01-11 VITALS
HEIGHT: 60 IN | HEART RATE: 69 BPM | DIASTOLIC BLOOD PRESSURE: 95 MMHG | BODY MASS INDEX: 34.36 KG/M2 | WEIGHT: 175 LBS | SYSTOLIC BLOOD PRESSURE: 151 MMHG

## 2023-01-11 PROCEDURE — 99214 OFFICE O/P EST MOD 30 MIN: CPT

## 2023-01-11 RX ORDER — MELOXICAM 15 MG/1
15 TABLET ORAL DAILY
Qty: 30 | Refills: 1 | Status: ACTIVE | COMMUNITY
Start: 2023-01-11 | End: 1900-01-01

## 2023-08-03 ENCOUNTER — NON-APPOINTMENT (OUTPATIENT)
Age: 58
End: 2023-08-03

## 2023-08-04 PROBLEM — M19.031 ARTHRITIS OF RIGHT WRIST: Status: ACTIVE | Noted: 2021-08-13

## 2023-08-04 PROBLEM — M18.11 PRIMARY OSTEOARTHRITIS OF FIRST CARPOMETACARPAL JOINT OF RIGHT HAND: Status: ACTIVE | Noted: 2021-08-13

## 2023-08-11 ENCOUNTER — APPOINTMENT (OUTPATIENT)
Dept: ORTHOPEDIC SURGERY | Facility: CLINIC | Age: 58
End: 2023-08-11
Payer: MEDICAID

## 2023-08-11 DIAGNOSIS — M19.031 PRIMARY OSTEOARTHRITIS, RIGHT WRIST: ICD-10-CM

## 2023-08-11 DIAGNOSIS — M18.11 UNILATERAL PRIMARY OSTEOARTHRITIS OF FIRST CARPOMETACARPAL JOINT, RIGHT HAND: ICD-10-CM

## 2023-08-11 PROCEDURE — 73110 X-RAY EXAM OF WRIST: CPT | Mod: RT

## 2023-08-11 PROCEDURE — 73130 X-RAY EXAM OF HAND: CPT | Mod: RT

## 2023-08-11 PROCEDURE — 99214 OFFICE O/P EST MOD 30 MIN: CPT

## 2023-08-11 NOTE — DISCUSSION/SUMMARY
[FreeTextEntry1] : I had a discussion regarding today's visit, the diagnosis and treatment recommendations and options.  We also discussed changes since the last visit.  At this time, given the persistence and chronicity of her symptoms, we discussed further treatment options of operative management, consisting of basal joint arthroplasty in addition to a scapho-trapezial joint arthroplasty.. The nature of operation, expected recovery and all postoperative protocols were reviewed in great detail with the patient. She does understand that even with surgery, there are no guarantees that her pain and associated symptoms will improve and/or full resolve. She stated she would like to further think about surgery, given she currently as issues with her knees and is seeing Dr. Abarca in that regard. I told her that this is reasonable, as although she has persistent pain, surgery with regards to her thumb, is not an emergency. She was provided with the information of our surgical scheduler, whom she will call if and when she opts to proceed.   -  The nature and purposes of the operation/procedure was discussed in detail.  I discussed the surgical procedure in detail, as well as expected postoperative recovery and outcome. -  Possible risks, benefits, and complications (from known and unknown causes) of the procedure were discussed in detail.   -  Possible non-operative alternatives to the proposed treatment were discussed in detail.   -  She was told that possible risks/complications include, but are not limited to:  Infection, sensory nerve or vessel injury, stiffness, painful scar, poor outcome, need for additional surgical procedures, and other unforeseen complications.   -  In addition, the possibility of an "unsuccessful outcome," despite "successful surgery," was discussed with the patient.   -  The patient fully understands these risks and wishes to proceed.   -  I had a lengthy discussion with the patient regarding today's visit, the diagnosis, and my surgical treatment recommendations.  The patient has agreed to this plan of management and has expressed full understanding.  All questions were fully answered to the patient's satisfaction.   My cumulative time spent on today's visit was greater than 30 minutes and included: Preparation for the visit, review of the medical records, review of pertinent diagnostic studies, examination and counseling of the patient on the above diagnosis, treatment plan and prognosis, orders of diagnostic tests, medications and/or appropriate procedures and documentation in the medical records of today's visit.

## 2023-08-11 NOTE — HISTORY OF PRESENT ILLNESS
[FreeTextEntry1] : Less than 2 years status post right thumb CMC joint cortisone injection #1.  She was also previously given a cortisone injection at the STT joint.    She returns today with continued pain, which she states never really subsided. She has constant pain, localized along the right thumb CMC joint.   Of note, she is now ambulating with a cane due to an accident since she was last seen.  She works as a paige.

## 2023-08-11 NOTE — PHYSICAL EXAM
[de-identified] : - Constitutional: This is a female in no obvious distress.   - Psych: Patient is alert and oriented to person, place and time.  Patient has a normal mood and affect. - Cardiovascular: Normal pulses throughout the upper extremities.  No significant varicosities are noted in the upper extremities.  - Neuro: Strength and sensation are intact throughout the upper extremities.  Patient has normal coordination. - Respiratory:  Patient exhibits no evidence of shortness of breath or difficulty breathing. - Skin: No rashes, lesions, or other abnormalities are noted in the upper extremities.  ---   Examination of her right hand and wrist demonstrates obvious swelling and tenderness along both the CMC and STT joints.  She has pain with motion.  She does have underlying ligamentous laxity of the CMC joint.  She has full flexion and extension of the digits.  Provocative signs for carpal tunnel syndrome are negative.  She remains neurologically intact distally along the radial, ulnar and median nerve distributions. 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  There is a large spur along the radial aspect of the trapezium heading towards the radial styloid.  There is radial subluxation of the first metacarpal on the trapezium.  There is also widening of the scapholunate interval.  An MRI from 7/6/21 demonstrated severe osteoarthritis to the triscaphe joint with radial and proximal subluxation of the trapezium, chronic complete tear of the anterior oblique ligament of the carpal metacarpal joint of the thumb with radial subluxation, chronic partial tearing of the scapholunate ligament complex without widening of the interval and moderate tenosynovitis of the flexor carpi radialis.

## 2023-08-11 NOTE — END OF VISIT
[FreeTextEntry3] : This note was written by Adela Zapien on 08/11/2023 acting solely as a scribe for Dr. Bipin Cano.   All medical record entries made by the Scribe were at my, Dr. Bipin Cano, direction and personally dictated by me on 08/11/2023. I have personally reviewed the chart and agree that the record accurately reflects my personal performance of the history, physical exam, assessment and plan.

## 2023-08-11 NOTE — ADDENDUM
[FreeTextEntry1] : I, Adela Zapien, acted solely as a scribe for Dr. Cano on this date on 08/11/2023.

## 2024-04-29 ENCOUNTER — APPOINTMENT (OUTPATIENT)
Dept: ORTHOPEDIC SURGERY | Facility: CLINIC | Age: 59
End: 2024-04-29
Payer: MEDICAID

## 2024-04-29 VITALS — DIASTOLIC BLOOD PRESSURE: 92 MMHG | HEART RATE: 60 BPM | SYSTOLIC BLOOD PRESSURE: 147 MMHG

## 2024-04-29 DIAGNOSIS — M17.12 UNILATERAL PRIMARY OSTEOARTHRITIS, LEFT KNEE: ICD-10-CM

## 2024-04-29 PROCEDURE — 99214 OFFICE O/P EST MOD 30 MIN: CPT | Mod: 25

## 2024-04-29 PROCEDURE — 73564 X-RAY EXAM KNEE 4 OR MORE: CPT | Mod: 50

## 2024-04-29 NOTE — REASON FOR VISIT
[Follow-Up Visit] : a follow-up visit for [FreeTextEntry2] : primary osteoarthritis of bilateral knees.

## 2024-04-29 NOTE — DISCUSSION/SUMMARY
[de-identified] : The patient presents today for follow-up regarding osteoarthritis about both knees.   I discussed the diagnosis and treatment recommendations.  This point due to the continued pain and the level of arthritis that she has I recommended bilateral total knee replacements.  Patient wishes to wait until she receives Medicare so she can obtain postoperative inpatient rehabilitation.  I have asked her to check with her insurance carrier again to see if it might be covered.  In addition I renewed her meloxicam prescription.  If the symptoms do not improve in the next 2 weeks I would see her back in the office at which point we will consider cortisone injection to the left knee.  At least 30 minutes was spent performing the evaluation and management on today's office visit.   At least 30 minutes was spent performing the evaluation and management on today's office visit.  This includes but is not limited to preparing to see patient including review of any test results or outside medical records, obtaining and/or reviewing separately obtained history, performing examination and evaluation, counseling and educating the patient on their diagnosis and treatment recommendations, ordering medications, tests, or procedures, documenting clinical information in the electronic health record, independently interpreting results (not separately reported) and communicating results to the patient, and coordination of care.

## 2024-04-29 NOTE — HISTORY OF PRESENT ILLNESS
[de-identified] : This patient presents today for follow-up regarding bilateral osteoarthritis about the knees.  Pain is bothering her left greater than right.  Pain level increases to 10 out of 10 with activity.  She uses Tylenol for the pain.  She is awaiting to obtain Medicare as her current insurance will not cover inpatient rehabilitation.  She has been having worsening pain about the knees which is somewhat severe.  She notes buckling intermittently.  She notes swelling.  She is ambulating with a cane.

## 2024-04-29 NOTE — PHYSICAL EXAM
[de-identified] : The patient appears well nourished  and in no apparent distress.  The patient is alert and oriented to person, place, and time.   Affect and mood appear normal.    The head is normocephalic and atraumatic.  The eyes reveal normal sclera and extra ocular muscles are intact.   The neck appears normal with no jugular venous distention or masses noted.   Skin shows normal turgor with no evidence of eczema or psoriasis.  No respiratory distress noted.  The patient ambulates with an antalgic gait.  The right and left knees have decreased range of motion the left 5-115 in the right 0-120.  Tenderness is noted diffusely over the left knee.  Pain is noted with terminal flexion.  Crepitations are noted..  There is a small effusion in the left knee.  There is tenderness about the medial joint bilaterally.  Negative Ai sign.  There is no soft tissue swelling, warmth, or erythema.   There is a negative Lachman sign.  There is no instability to varus/valgus stress or anterior/posterior drawer.  There is normal strength in the in the quadriceps and hamstring muscles.  Sensation is intact to the lower estremity. There are normal pulses distally and good capillary refill. No edema or lymphadenopathy noted.    [de-identified] : AP, lateral, tunnel, and merchant views of the right and left knees were obtained.  There is varus alignment about both knees.  There is severe medial compartment narrowing of the left knee and moderate to severe narrowing of the right knee.  Both knees reveal severe patellofemoral narrowing.  No fractures or dislocations are noted.

## 2024-05-01 RX ORDER — MELOXICAM 15 MG/1
15 TABLET ORAL
Qty: 30 | Refills: 0 | Status: ACTIVE | COMMUNITY
Start: 2022-06-07 | End: 1900-01-01

## 2024-06-12 DIAGNOSIS — M17.11 UNILATERAL PRIMARY OSTEOARTHRITIS, RIGHT KNEE: ICD-10-CM
